# Patient Record
Sex: FEMALE | Race: WHITE
[De-identification: names, ages, dates, MRNs, and addresses within clinical notes are randomized per-mention and may not be internally consistent; named-entity substitution may affect disease eponyms.]

---

## 2019-11-27 ENCOUNTER — HOSPITAL ENCOUNTER (EMERGENCY)
Dept: HOSPITAL 96 - M.ERS | Age: 34
Discharge: HOME | End: 2019-11-27
Payer: COMMERCIAL

## 2019-11-27 VITALS — BODY MASS INDEX: 42.44 KG/M2 | HEIGHT: 68 IN | WEIGHT: 280.01 LBS

## 2019-11-27 VITALS — SYSTOLIC BLOOD PRESSURE: 154 MMHG | DIASTOLIC BLOOD PRESSURE: 89 MMHG

## 2019-11-27 DIAGNOSIS — Z98.890: ICD-10-CM

## 2019-11-27 DIAGNOSIS — J40: Primary | ICD-10-CM

## 2019-11-27 DIAGNOSIS — Z90.89: ICD-10-CM

## 2019-12-18 ENCOUNTER — HOSPITAL ENCOUNTER (INPATIENT)
Dept: HOSPITAL 96 - M.ERS | Age: 34
LOS: 2 days | Discharge: HOME | DRG: 311 | End: 2019-12-20
Attending: HOSPITALIST | Admitting: HOSPITALIST
Payer: COMMERCIAL

## 2019-12-18 VITALS — WEIGHT: 283 LBS | HEIGHT: 67.99 IN | BODY MASS INDEX: 42.89 KG/M2

## 2019-12-18 VITALS — DIASTOLIC BLOOD PRESSURE: 93 MMHG | SYSTOLIC BLOOD PRESSURE: 145 MMHG

## 2019-12-18 DIAGNOSIS — R50.9: ICD-10-CM

## 2019-12-18 DIAGNOSIS — F41.9: ICD-10-CM

## 2019-12-18 DIAGNOSIS — I20.8: Primary | ICD-10-CM

## 2019-12-18 DIAGNOSIS — E78.5: ICD-10-CM

## 2019-12-18 DIAGNOSIS — E66.9: ICD-10-CM

## 2019-12-18 DIAGNOSIS — E06.3: ICD-10-CM

## 2019-12-18 DIAGNOSIS — E03.9: ICD-10-CM

## 2019-12-18 DIAGNOSIS — R00.0: ICD-10-CM

## 2019-12-18 LAB
ABSOLUTE BASOPHILS: 0 THOU/UL (ref 0–0.2)
ABSOLUTE EOSINOPHILS: 0 THOU/UL (ref 0–0.7)
ABSOLUTE MONOCYTES: 0.5 THOU/UL (ref 0–1.2)
ALBUMIN SERPL-MCNC: 3.4 G/DL (ref 3.4–5)
ALP SERPL-CCNC: 89 U/L (ref 46–116)
ALT SERPL-CCNC: 23 U/L (ref 30–65)
ANION GAP SERPL CALC-SCNC: 9 MMOL/L (ref 7–16)
APTT BLD: 26.3 SECONDS (ref 25–31.3)
AST SERPL-CCNC: 13 U/L (ref 15–37)
BASOPHILS NFR BLD AUTO: 1 %
BILIRUB SERPL-MCNC: 0.4 MG/DL
BUN SERPL-MCNC: 10 MG/DL (ref 7–18)
CALCIUM SERPL-MCNC: 9.1 MG/DL (ref 8.5–10.1)
CHLORIDE SERPL-SCNC: 104 MMOL/L (ref 98–107)
CO2 SERPL-SCNC: 26 MMOL/L (ref 21–32)
CREAT SERPL-MCNC: 0.9 MG/DL (ref 0.6–1.3)
EOSINOPHIL NFR BLD: 1 %
GLUCOSE SERPL-MCNC: 121 MG/DL (ref 70–99)
GRANULOCYTES NFR BLD MANUAL: 71.4 %
HCT VFR BLD CALC: 36 % (ref 37–47)
HGB BLD-MCNC: 12.2 GM/DL (ref 12–15)
INR PPP: 1
LYMPHOCYTES # BLD: 0.4 THOU/UL (ref 0.8–5.3)
LYMPHOCYTES NFR BLD AUTO: 12.1 %
MCH RBC QN AUTO: 28.4 PG (ref 26–34)
MCHC RBC AUTO-ENTMCNC: 34 G/DL (ref 28–37)
MCV RBC: 83.6 FL (ref 80–100)
MONOCYTES NFR BLD: 14.5 %
MPV: 8.6 FL. (ref 7.2–11.1)
NEUTROPHILS # BLD: 2.6 THOU/UL (ref 1.6–8.1)
NUCLEATED RBCS: 0 /100WBC
PLATELET COUNT*: 248 THOU/UL (ref 150–400)
POTASSIUM SERPL-SCNC: 3.9 MMOL/L (ref 3.5–5.1)
PROT SERPL-MCNC: 7.3 G/DL (ref 6.4–8.2)
PROTHROMBIN TIME: 10.7 SECONDS (ref 9.2–11.5)
RBC # BLD AUTO: 4.31 MIL/UL (ref 4.2–5)
RDW-CV: 12.8 % (ref 10.5–14.5)
SODIUM SERPL-SCNC: 139 MMOL/L (ref 136–145)
WBC # BLD AUTO: 3.6 THOU/UL (ref 4–11)

## 2019-12-19 VITALS — SYSTOLIC BLOOD PRESSURE: 137 MMHG | DIASTOLIC BLOOD PRESSURE: 68 MMHG

## 2019-12-19 VITALS — DIASTOLIC BLOOD PRESSURE: 43 MMHG | SYSTOLIC BLOOD PRESSURE: 100 MMHG

## 2019-12-19 VITALS — DIASTOLIC BLOOD PRESSURE: 70 MMHG | SYSTOLIC BLOOD PRESSURE: 118 MMHG

## 2019-12-19 VITALS — DIASTOLIC BLOOD PRESSURE: 63 MMHG | SYSTOLIC BLOOD PRESSURE: 99 MMHG

## 2019-12-19 VITALS — DIASTOLIC BLOOD PRESSURE: 61 MMHG | SYSTOLIC BLOOD PRESSURE: 101 MMHG

## 2019-12-19 VITALS — SYSTOLIC BLOOD PRESSURE: 122 MMHG | DIASTOLIC BLOOD PRESSURE: 70 MMHG

## 2019-12-19 LAB
ABSOLUTE EOSINOPHILS: 0.1 THOU/UL (ref 0–0.7)
ABSOLUTE MONOCYTES: 0.7 THOU/UL (ref 0–1.2)
ANION GAP SERPL CALC-SCNC: 9 MMOL/L (ref 7–16)
ANISOCYTOSIS BLD QL SMEAR: (no result)
BUN SERPL-MCNC: 8 MG/DL (ref 7–18)
CALCIUM SERPL-MCNC: 8 MG/DL (ref 8.5–10.1)
CHLORIDE SERPL-SCNC: 108 MMOL/L (ref 98–107)
CHOLEST SERPL-MCNC: 143 MG/DL (ref ?–200)
CO2 SERPL-SCNC: 24 MMOL/L (ref 21–32)
CREAT SERPL-MCNC: 0.7 MG/DL (ref 0.6–1.3)
EOSINOPHIL NFR BLD: 4 %
GLUCOSE SERPL-MCNC: 96 MG/DL (ref 70–99)
GRANULOCYTES NFR BLD MANUAL: 50 %
HCT VFR BLD CALC: 31.1 % (ref 37–47)
HDLC SERPL-MCNC: 37 MG/DL (ref 40–?)
HGB BLD-MCNC: 10.8 GM/DL (ref 12–15)
LDLC SERPL-MCNC: 93 MG/DL (ref ?–100)
LYMPHOCYTES # BLD: 0.4 THOU/UL (ref 0.8–5.3)
LYMPHOCYTES NFR BLD AUTO: 17 %
MCH RBC QN AUTO: 29 PG (ref 26–34)
MCHC RBC AUTO-ENTMCNC: 34.8 G/DL (ref 28–37)
MCV RBC: 83.5 FL (ref 80–100)
MONOCYTES NFR BLD: 29 %
MPV: 8.7 FL. (ref 7.2–11.1)
NEUTROPHILS # BLD: 1.2 THOU/UL (ref 1.6–8.1)
NUCLEATED RBCS: 0 /100WBC
PLATELET # BLD EST: ADEQUATE 10*3/UL
PLATELET COUNT*: 201 THOU/UL (ref 150–400)
POIKILOCYTOSIS BLD QL SMEAR: (no result)
POTASSIUM SERPL-SCNC: 3.7 MMOL/L (ref 3.5–5.1)
RBC # BLD AUTO: 3.72 MIL/UL (ref 4.2–5)
RDW-CV: 12.8 % (ref 10.5–14.5)
SODIUM SERPL-SCNC: 141 MMOL/L (ref 136–145)
TC:HDL: 3.9 RATIO
TRIGL SERPL-MCNC: 68 MG/DL (ref ?–150)
VLDLC SERPL CALC-MCNC: 14 MG/DL (ref ?–40)
WBC # BLD AUTO: 2.4 THOU/UL (ref 4–11)

## 2019-12-19 NOTE — 2DMMODE
[place] [address]
Phone:  [phone] 2 D/M-MODE ECHOCARDIOGRAM     
_______________________________________________________________________________
 
Name:         MARCELO STANLEY                Room:          [address] [place] 
[facility]#:    [id number]     Account #:     [id number]  
Admission:    19    Attend Phys:   Bill Munoz MD 
Discharge:                Date of Birth: [birth year]  
Date of Service: 19 1628  Report #:      [id number]
        [id number]
_______________________________________________________________________________
THIS REPORT FOR:  //name//                      
 
 
--------------- APPROVED REPORT --------------
 
 
Study performed:  2019 15:44:02
 
EXAM: Comprehensive 2D, Doppler, and color-flow 
Echocardiogram 
Patient Location: In-Patient   
Room #:  [facility]     Status:  routine
 
      BSA:         2.36
HR: 86 bpm BP:          118/70 mmHg 
Rhythm: NSR     
 
Other Information 
Study Quality: Good
 
Indications
Tachycardia
Chest Pain
 
2D Dimensions
IVSd:  8.50 (7-11mm) LVOT Diam:  19.95 (18-24mm) 
LVDd:  40.70 mm  
PWd:  8.39 (7-11mm) Ascending Ao:  26.98 (22-36mm)
LVDs:  24.04 (25-40mm) 
Aortic Root:  29.06 mm 
 
Volumes
Left Atrial Volume (Systole) 
    LA ESV Index:  22.70 mL/m2
 
Aortic Valve
AoV Peak Jaden.:  1.42 m/s 
AO Peak Gr.:  8.04 mmHg  LVOT Max P.46 mmHg
AO Mean Gr.:  4.58 mmHg  LVOT Mean PG:  3.96 mmHg
    LVOT Max V:  1.45 m/s
AO V2 VTI:  25.53 cm  LVOT Mean V:  0.90 m/s
DIXIE (VTI):  3.20 cm2  LVOT V1 VTI:  26.13 cm
 
Mitral Valve
    E/A Ratio:  1.46
    MV Decel. Time:  234.13 ms
 
 
[place] [address]
Phone:  [phone]                     2 D/M-MODE ECHOCARDIOGRAM     
_______________________________________________________________________________
 
Name:         MARCELO STANLEY                Room:          [address] [place] 
..#:    [id number]     Account #:     [id number]  
Admission:    19    Attend Phys:   Bill Munoz MD 
Discharge:                Date of Birth: [birth year]  
Date of Service: 19 1628  Report #:      [id number]
        [id number]
_______________________________________________________________________________
MV E Max Jaden.:  0.76 m/s 
MV PHT:  67.90 ms  
MVA (PHT):  3.24 cm2  
 
TDI
E/Lateral E':  5.43 E/Medial E':  5.85
   Medial E' Jaden.:  0.13 m/s
   Lateral E' Jaden.:  0.14 m/s
 
Pulmonary Valve
PV Peak Jaden.:  1.09 m/s PV Peak Gr.:  4.73 mmHg
 
Left Ventricle
The left ventricle is normal size. There is normal LV segmental wall 
motion. There is normal left ventricular wall thickness. Left 
ventricular systolic function is normal. The left ventricular 
ejection fraction is within the normal range. LVEF is 60-65%. The 
left ventricular diastolic function is normal.
 
Right Ventricle
The right ventricle is normal size. The right ventricular systolic 
function is normal.
 
Atria
The left atrium size is normal. The right atrium size is 
normal.
 
Aortic Valve
The aortic valve is normal in structure. No aortic regurgitation is 
present. There is no aortic valvular stenosis.
 
Mitral Valve
The mitral valve is normal in structure. There is no mitral valve 
regurgitation noted. No evidence of mitral valve stenosis.
 
Tricuspid Valve
The tricuspid valve is normal in structure. Unable to assess PA 
pressure. Trace tricuspid regurgitation.
 
Pulmonic Valve
The pulmonary valve is normal in structure. Trace pulmonic 
regurgitation.
 
Great Vessels
The aortic root is normal in size. IVC is normal in size and 
collapses >50% with inspiration.
 
 
[place] [address]
Phone:  [phone]                     2 D/M-MODE ECHOCARDIOGRAM     
_______________________________________________________________________________
 
Name:         MARCELO STANLEY                Room:          [address] [place] 
[facility]#:    [id number]     Account #:     [id number]  
Admission:    19    Attend Phys:   Bill Munoz MD 
Discharge:                Date of Birth: [birth year]  
Date of Service: 19 1628  Report #:      [id number]
        [id number]
_______________________________________________________________________________
 
Pericardium
There is no pericardial effusion.
 
<Conclusion>
LVEF is 60-65%.
The left ventricle is normal size.
There is normal left ventricular wall thickness.
There is normal LV segmental wall motion.
Left ventricular systolic function is normal.
The left ventricular ejection fraction is within the normal 
range.
The left ventricular diastolic function is normal.
Unable to assess PA pressure.
Trace tricuspid regurgitation.
Trace pulmonic regurgitation.
 
 
 
 
 
 
 
 
 
 
 
 
 
 
 
 
 
 
 
 
 
 
 
 
 
 
 
 
  <ELECTRONICALLY SIGNED>
                                           By: SONA Mendoza MD, FACC    
  19     1628
D: 19 1628   _____________________________________
T: 19 1628   SONA Mendoza MD, FACC      /INF

## 2019-12-19 NOTE — NUR
PT ADMITTED TO ROOM 225 FROM ED WITH DX OF CP,ELEVATED D-DIMER,DECREASED TSH.
PT CTA WAS INCONCLUSIVE R/T PERFUSION. LOVENOX,ASPIRIN AND IV BOLUS GIVEN IN
ED. PT IS AOX4, DENIES PAIN AT THIS TIME. VSS ON RA. PT ORIENTED TO POC,
TREATMENTS AND ROOM. PT FALL AGREEMENT SIGNED. PT IS SR ON TELE. MEDICATIONS
GIVEN PER EMAR.PT IS UP AD JIM WITH CALL LIGHT IN REACH.

## 2019-12-19 NOTE — NUR
CM COMPLETED INITIAL ASSESSMENT TO DISCUSS D/C PLANNING. PT A&OX4. PT FAMILY
PRESENT DURING ASSESSMENT. PT LIVES W/SPOUSE AND CHILDREN. "BABYSITS NEPHEW"
FOR INCOME. PT IS MEDICAID PENDING. PT HAS GOOD FAMILY SUPPORT. NO DMES, OR
ANY HX W/SNF OR HH. CM TO REMAIN AVAIL TO ASSIST AS NEEDED.

## 2019-12-19 NOTE — NUR
Nutrition: Consult received for "hypothyroidism." Pt with Hashimotos, no
thyroid at all. Currently adjusting thyroid med/dosages to get it right.
Albumin 3.4, decreased TSH. Wt: 280#. Regular diet. Did not go in detail about
thyroid-type diet, iodine, etc since pt has no thyroid. Did discuss general
healthy diet, adding F/V for more energy. Pt was recptive to conversation.
Otherwise, low risk.

## 2019-12-19 NOTE — EKG
[place] [address]
Phone:  [phone]                     ELECTROCARDIOGRAM REPORT      
_______________________________________________________________________________
 
Name:       MARCELO STANLEY                 Room:           [address]    [place]  
M.R.#:  [id number]      Account #:      [id number]  
Admission:  19     Attend Phys:    Bill Munoz MD    
Discharge:               Date of Birth:  [birth year]  
         Report #: [id number]
    [id number]
_______________________________________________________________________________
THIS REPORT FOR:  //name//                      
 
                         [facility] ED
                                       
Test Date:    2019               Test Time:    20:17:18
Pat Name:     MARCELO STANLEY             Department:   
Patient ID:   [id number]            Room:         [facility]
Gender:       F                        Technician:   
:          1985               Requested By: Marjorie Drew
Order Number: [id number] MD:   Benigno Henriquez
                                 Measurements
Intervals                              Axis          
Rate:         112                      P:            58
TN:           169                      QRS:          -46
QRSD:         97                       T:            55
QT:           315                                    
QTc:          430                                    
                           Interpretive Statements
Sinus tachycardia
Atrial premature complexes
Left anterior fascicular block
No previous ECG available for comparison
 
Electronically Signed On 2019 12:52:05 CST by Benigno Henriquez
[email or web address]
 
 
 
 
 
 
 
 
 
 
 
 
 
 
 
 
 
 
  <ELECTRONICALLY SIGNED>
                                           By: Benigno Henriquez MD, [facility]     
  19     1252
D: 19   _____________________________________
T: 19   Benigno Henriquez MD, FACC       /EPI

## 2019-12-20 VITALS — DIASTOLIC BLOOD PRESSURE: 49 MMHG | SYSTOLIC BLOOD PRESSURE: 96 MMHG

## 2019-12-20 VITALS — SYSTOLIC BLOOD PRESSURE: 94 MMHG | DIASTOLIC BLOOD PRESSURE: 51 MMHG

## 2019-12-20 VITALS — DIASTOLIC BLOOD PRESSURE: 55 MMHG | SYSTOLIC BLOOD PRESSURE: 110 MMHG

## 2019-12-20 VITALS — SYSTOLIC BLOOD PRESSURE: 93 MMHG | DIASTOLIC BLOOD PRESSURE: 58 MMHG

## 2019-12-20 LAB
ALBUMIN SERPL-MCNC: 2.9 G/DL (ref 3.4–5)
ALP SERPL-CCNC: 74 U/L (ref 46–116)
ALT SERPL-CCNC: 23 U/L (ref 30–65)
ANION GAP SERPL CALC-SCNC: 6 MMOL/L (ref 7–16)
AST SERPL-CCNC: 13 U/L (ref 15–37)
BILIRUB SERPL-MCNC: 0.3 MG/DL
BUN SERPL-MCNC: 9 MG/DL (ref 7–18)
CALCIUM SERPL-MCNC: 8.7 MG/DL (ref 8.5–10.1)
CHLORIDE SERPL-SCNC: 108 MMOL/L (ref 98–107)
CO2 SERPL-SCNC: 28 MMOL/L (ref 21–32)
CREAT SERPL-MCNC: 0.7 MG/DL (ref 0.6–1.3)
EST. AVERAGE GLUCOSE BLD GHB EST-MCNC: 108 MG/DL
GLUCOSE SERPL-MCNC: 100 MG/DL (ref 70–99)
GLYCOHEMOGLOBIN (HGB A1C): 5.4 % (ref 4.8–5.6)
HCT VFR BLD CALC: 33.5 % (ref 37–47)
HGB BLD-MCNC: 11.3 GM/DL (ref 12–15)
MAGNESIUM SERPL-MCNC: 1.7 MG/DL (ref 1.8–2.4)
MCH RBC QN AUTO: 28.5 PG (ref 26–34)
MCHC RBC AUTO-ENTMCNC: 33.8 G/DL (ref 28–37)
MCV RBC: 84.3 FL (ref 80–100)
MPV: 8.3 FL. (ref 7.2–11.1)
PLATELET COUNT*: 217 THOU/UL (ref 150–400)
POTASSIUM SERPL-SCNC: 4 MMOL/L (ref 3.5–5.1)
PROT SERPL-MCNC: 6.2 G/DL (ref 6.4–8.2)
RBC # BLD AUTO: 3.97 MIL/UL (ref 4.2–5)
RDW-CV: 13 % (ref 10.5–14.5)
SODIUM SERPL-SCNC: 142 MMOL/L (ref 136–145)
WBC # BLD AUTO: 2.8 THOU/UL (ref 4–11)

## 2019-12-20 NOTE — NUR
ASSUMED PT CARE AT 0800, AOX4, UP AD JIM, O2 SAT 90'S RA. TRACING SINUS TACH
ON TELE. PT DENIES PAIN. PT FOR DISCHARGE. AM ASSESSMENT AS CHARTED, MEDS
GIVEN PER MAR. CALL LIGHT WITHIN REACH WILL CONTINUE TO MONITOR

## 2019-12-20 NOTE — NUR
NANY spoke with Dr Manuel requesting Endo f/u. Pt wants Dr Cruz CM contacted Dr Cole office, cost for patient pay appt will be between $150-280. Updated Pt,
per Pt she will have health insurance on 1/1/20. Faxed referral and instructed
Pt to contact Dr Arroyo's office to schedule an appt post 1/1/20

## 2019-12-20 NOTE — NUR
PT HAS RESTED COMFORTABLY T/O NIGHT WITHOUT C/O PAIN OR SOA. PT IS NOT
CURRENTLY TAKING HOME LEVOTHYROXINE PER EMAR.

## 2019-12-20 NOTE — NUR
DISCHARGED PLAN DISCUSSED WITH THE PT. MEDICATION PACKET GIVEN. IV, TELE
REMOVED. ALL BELONGINGS PACKED AND CHECKED. LEFT THE UNIT AT 1315.